# Patient Record
Sex: MALE | Race: WHITE
[De-identification: names, ages, dates, MRNs, and addresses within clinical notes are randomized per-mention and may not be internally consistent; named-entity substitution may affect disease eponyms.]

---

## 2019-09-10 ENCOUNTER — HOSPITAL ENCOUNTER (INPATIENT)
Dept: HOSPITAL 35 - ER | Age: 79
LOS: 3 days | Discharge: HOME | DRG: 552 | End: 2019-09-13
Attending: HOSPITALIST | Admitting: HOSPITALIST
Payer: COMMERCIAL

## 2019-09-10 VITALS — DIASTOLIC BLOOD PRESSURE: 84 MMHG | SYSTOLIC BLOOD PRESSURE: 137 MMHG

## 2019-09-10 VITALS — SYSTOLIC BLOOD PRESSURE: 133 MMHG | DIASTOLIC BLOOD PRESSURE: 82 MMHG

## 2019-09-10 VITALS — WEIGHT: 271.2 LBS | HEIGHT: 75 IN | BODY MASS INDEX: 33.72 KG/M2

## 2019-09-10 VITALS — DIASTOLIC BLOOD PRESSURE: 99 MMHG | SYSTOLIC BLOOD PRESSURE: 176 MMHG

## 2019-09-10 DIAGNOSIS — Z79.82: ICD-10-CM

## 2019-09-10 DIAGNOSIS — K21.9: ICD-10-CM

## 2019-09-10 DIAGNOSIS — G89.29: ICD-10-CM

## 2019-09-10 DIAGNOSIS — Z79.899: ICD-10-CM

## 2019-09-10 DIAGNOSIS — M54.31: ICD-10-CM

## 2019-09-10 DIAGNOSIS — D50.9: ICD-10-CM

## 2019-09-10 DIAGNOSIS — E87.1: ICD-10-CM

## 2019-09-10 DIAGNOSIS — N40.0: ICD-10-CM

## 2019-09-10 DIAGNOSIS — I10: ICD-10-CM

## 2019-09-10 DIAGNOSIS — M48.061: Primary | ICD-10-CM

## 2019-09-10 DIAGNOSIS — F32.9: ICD-10-CM

## 2019-09-10 DIAGNOSIS — M54.89: ICD-10-CM

## 2019-09-10 DIAGNOSIS — G47.33: ICD-10-CM

## 2019-09-10 DIAGNOSIS — Z87.891: ICD-10-CM

## 2019-09-10 DIAGNOSIS — M47.896: ICD-10-CM

## 2019-09-10 DIAGNOSIS — E11.42: ICD-10-CM

## 2019-09-10 DIAGNOSIS — E78.5: ICD-10-CM

## 2019-09-10 DIAGNOSIS — E83.42: ICD-10-CM

## 2019-09-10 LAB
ANION GAP SERPL CALC-SCNC: 8 MMOL/L (ref 7–16)
ANISOCYTOSIS BLD QL SMEAR: (no result)
BILIRUB UR-MCNC: NEGATIVE MG/DL
BUN SERPL-MCNC: 25 MG/DL (ref 7–18)
CALCIUM SERPL-MCNC: 8.9 MG/DL (ref 8.5–10.1)
CHLORIDE SERPL-SCNC: 94 MMOL/L (ref 98–107)
CO2 SERPL-SCNC: 26 MMOL/L (ref 21–32)
COLOR UR: YELLOW
CREAT SERPL-MCNC: 1.3 MG/DL (ref 0.7–1.3)
ERYTHROCYTE [DISTWIDTH] IN BLOOD BY AUTOMATED COUNT: 26.3 % (ref 10.5–14.5)
GLUCOSE SERPL-MCNC: 156 MG/DL (ref 74–106)
GRANULOCYTES NFR BLD MANUAL: 89 % (ref 36–66)
HCT VFR BLD CALC: 37.4 % (ref 42–52)
HGB BLD-MCNC: 12.3 GM/DL (ref 14–18)
KETONES UR STRIP-MCNC: NEGATIVE MG/DL
LYMPHOCYTES NFR BLD AUTO: 7 % (ref 24–44)
MCH RBC QN AUTO: 26.5 PG (ref 26–34)
MCHC RBC AUTO-ENTMCNC: 32.8 G/DL (ref 28–37)
MCV RBC: 80.8 FL (ref 80–100)
MONOCYTES NFR BLD: 2 % (ref 1–8)
NEUTROPHILS # BLD: 6.7 THOU/UL (ref 1.4–8.2)
NEUTS BAND NFR BLD: 2 % (ref 0–8)
PLATELET # BLD EST: NORMAL 10*3/UL
PLATELET # BLD: 193 THOU/UL (ref 150–400)
POTASSIUM SERPL-SCNC: 5.2 MMOL/L (ref 3.5–5.1)
RBC # BLD AUTO: 4.63 MIL/UL (ref 4.5–6)
RBC # UR STRIP: NEGATIVE /UL
SODIUM SERPL-SCNC: 128 MMOL/L (ref 136–145)
SP GR UR STRIP: 1.01 (ref 1–1.03)
URINE CLARITY: CLEAR
URINE GLUCOSE-RANDOM*: NEGATIVE
URINE LEUKOCYTES-REFLEX: (no result)
URINE NITRITE-REFLEX: NEGATIVE
URINE PROTEIN (DIPSTICK): NEGATIVE
UROBILINOGEN UR STRIP-ACNC: 0.2 E.U./DL (ref 0.2–1)
WBC # BLD AUTO: 7.4 THOU/UL (ref 4–11)

## 2019-09-10 PROCEDURE — 10040 EXTRACTION: CPT

## 2019-09-11 VITALS — SYSTOLIC BLOOD PRESSURE: 179 MMHG | DIASTOLIC BLOOD PRESSURE: 95 MMHG

## 2019-09-11 VITALS — DIASTOLIC BLOOD PRESSURE: 77 MMHG | SYSTOLIC BLOOD PRESSURE: 135 MMHG

## 2019-09-11 VITALS — SYSTOLIC BLOOD PRESSURE: 171 MMHG | DIASTOLIC BLOOD PRESSURE: 91 MMHG

## 2019-09-11 VITALS — DIASTOLIC BLOOD PRESSURE: 89 MMHG | SYSTOLIC BLOOD PRESSURE: 173 MMHG

## 2019-09-11 VITALS — DIASTOLIC BLOOD PRESSURE: 91 MMHG | SYSTOLIC BLOOD PRESSURE: 155 MMHG

## 2019-09-11 LAB
ANION GAP SERPL CALC-SCNC: 8 MMOL/L (ref 7–16)
BUN SERPL-MCNC: 31 MG/DL (ref 7–18)
CALCIUM SERPL-MCNC: 9.4 MG/DL (ref 8.5–10.1)
CHLORIDE SERPL-SCNC: 98 MMOL/L (ref 98–107)
CO2 SERPL-SCNC: 27 MMOL/L (ref 21–32)
CREAT SERPL-MCNC: 1.2 MG/DL (ref 0.7–1.3)
GLUCOSE SERPL-MCNC: 180 MG/DL (ref 74–106)
POTASSIUM SERPL-SCNC: 5.1 MMOL/L (ref 3.5–5.1)
SODIUM SERPL-SCNC: 133 MMOL/L (ref 136–145)

## 2019-09-11 NOTE — NUR
ASSUMED CARE FROM ED , PT AMBULATED FROM [facility] TO BED ASSESSMENT AND DATA
BASE COMPLETED, PAIN MEDICATION GIVEN AS PRESCRIBED , BP ELEVATED , NP NOTIFED
, NO ORDERS RECIEVED, WILL CONTINUE TO MONITOR.DISCUSSED PLAN OF CARE AND
AGREEABLE AND VERBALIZED UNDERSTANDING. PT RESTED WELL THROUGHOUT HOURLY
ROUNDS, WILL REPORT CHANGES OR ABNORMAL FINDINGS.

## 2019-09-11 NOTE — NUR
PT ADMITTED RELATED TO BACK PAIN CHRONIC. CM REVIEWED CHART AND SPOKE WITH
CARE TEAM. CM MET WITH PT AT BEDSIDE THIS DAY. PT IS A&O X4. CM ROLE
INTRODCUED. PT INDICATED THAT HE LIVES IN A HOUSE WITH HIS SPOUSE WITH 2 STEPS
TO ENTER AND NO STEPS INSIDE. PT INDICATED HE HAD BEEN USING A FWW TO ASSSIT
WITH MOBILITY PTA DUE TO PAIN. PT INDICATED NO HH OR SNF HX. PT INDICATED HE
HOPES TO RETURN HOME ONCE MEDICALLY STABLE. HE INDICATED THAT IF CARE TEAM
RECOMMENDS SHORT TERM POST ACUTE CARE STAY THAT THE WOULD BE RECEPTIVE. CM TO
FOLLOW AS INDICATED WITH DC PLANNING.

## 2019-09-11 NOTE — NUR
ASSUMED CARE OF PATIENT AT 0715, PATIENT ALERT AND ORIENTED X 4. PATIENT VSS.
PATIENT C/O PAIN WITH LEFT FOOT, RECEIVED HYDROCODONE X 2 THIS SHIFT. PATIENT
HAS LEFT FOREARM IV IN PLACE, RECEIVING IV ANTIBIOTICS. PATIENT NPO AFTER
BREAKFAST, DUE TO SURGERY AT 1730/DR GOVEA. DRESSING CHNAGE TO LEFT FOOT
TODAY, TOLERATED WELL. CONSULT FOR ENDOCRINE/DR MALONE CALLED TODAY. PATIENT
LEFT FLOOR AT 1635 FOR SURGERY. WILL CONTINUE TO MONITOR.

## 2019-09-11 NOTE — NUR
ASSSUMED CARE OF PATIENT AT 0715, PATIENT ALERT AND ORIENTED X4. PATIENT UP
WITH ASSIST X 1 WITH GAIT BELT AND WALKER. PATIENT CONTINUES TO C/O PAIN WITH
LOW BACK RADIATING DOWN LEGS. PATIENT RECEIEVED HYDROCODONE 2 TABLETS X 2 THIS
SHIFT. AM MEDS GIVEN AND PATIENT VOMITED ALL MEDS, NOTIFIED DR SOLORIO, PATIENT
GIVEN ZOFRAN IV, AND ALL AM MEDS REGIVEN, NO N/V REPORTED AFTER THIS AM
EPISODE. PATIENT HAS LEFT FOREARM IV WITH NS AT 80CC/HR. IV FLUIDS
DISCONTINUED THIS SHIFT. B/P ELEVATED THIS SHIFT 171/91 /95, THIS RN
NOTIFIED DR SOLORIO, RECEIVED ORDER FOR HYDRALAZINE 10 MG EVERY 4 HR/PRN B/P
GREATER THAN 170, THIS RN GAVE HYDRALAZINE 10 MG AT END OF THE SHIFT. BLOOD
SUGAR MONITORING AS ORDERED, S/S INSULIN GIVEN AS NEEDED. PT WORKED WITH
PATIENT THIS AFTERNOON. WILL CONTINUE TO MONITOR.

## 2019-09-12 VITALS — SYSTOLIC BLOOD PRESSURE: 148 MMHG | DIASTOLIC BLOOD PRESSURE: 78 MMHG

## 2019-09-12 VITALS — DIASTOLIC BLOOD PRESSURE: 83 MMHG | SYSTOLIC BLOOD PRESSURE: 147 MMHG

## 2019-09-12 VITALS — SYSTOLIC BLOOD PRESSURE: 138 MMHG | DIASTOLIC BLOOD PRESSURE: 85 MMHG

## 2019-09-12 LAB
ANION GAP SERPL CALC-SCNC: 9 MMOL/L (ref 7–16)
BUN SERPL-MCNC: 32 MG/DL (ref 7–18)
CALCIUM SERPL-MCNC: 9.3 MG/DL (ref 8.5–10.1)
CHLORIDE SERPL-SCNC: 97 MMOL/L (ref 98–107)
CO2 SERPL-SCNC: 27 MMOL/L (ref 21–32)
CREAT SERPL-MCNC: 1 MG/DL (ref 0.7–1.3)
ERYTHROCYTE [DISTWIDTH] IN BLOOD BY AUTOMATED COUNT: 26.6 % (ref 10.5–14.5)
GLUCOSE SERPL-MCNC: 111 MG/DL (ref 74–106)
HCT VFR BLD CALC: 38.5 % (ref 42–52)
HGB BLD-MCNC: 12.4 GM/DL (ref 14–18)
MCH RBC QN AUTO: 26.2 PG (ref 26–34)
MCHC RBC AUTO-ENTMCNC: 32.2 G/DL (ref 28–37)
MCV RBC: 81.3 FL (ref 80–100)
PLATELET # BLD: 218 THOU/UL (ref 150–400)
POTASSIUM SERPL-SCNC: 4.8 MMOL/L (ref 3.5–5.1)
RBC # BLD AUTO: 4.73 MIL/UL (ref 4.5–6)
SODIUM SERPL-SCNC: 133 MMOL/L (ref 136–145)
WBC # BLD AUTO: 11.3 THOU/UL (ref 4–11)

## 2019-09-12 NOTE — NUR
ASSUMED CARE OF PATIENT AT 0715, PATIENT ALERT AND ORIENTED X 4. PATIENT C/O
PAIN WITH LOW BACK RADIATING DOWN RIGHT LEG, RECEIVED HYDROCODONE 2 TABLETS X
2 THIS SHIFT. PATIENT WORKED WITH PT/OT TODAY. PATIENT HAS LEFT FOREARM IV IN
PLACE, FLUSHED WITH NS AND REMAINS PATENT. BLOOD SUGAR MONITORING AS ORDERED,
S/S INSULIN GIVEN PER BLOOD SUGAR READING. CONSULT ORDER FOR DR TRAMMELL TODAY.
PATIENT VOIDS PER URINAL. WILL CONTINUE TO MONITOR.

## 2019-09-13 VITALS — DIASTOLIC BLOOD PRESSURE: 98 MMHG | SYSTOLIC BLOOD PRESSURE: 162 MMHG

## 2019-09-13 VITALS — DIASTOLIC BLOOD PRESSURE: 78 MMHG | SYSTOLIC BLOOD PRESSURE: 148 MMHG

## 2019-09-13 NOTE — NUR
progress
 
pt a/o x4 rates back pain a 7 to 9 took 5mg hydrocodne x2 with relief pt slept
all night voiding per urinal, iv antibiotics and fluids administered as
ordered hopes to discharge in the next few days.

## 2019-09-13 NOTE — NUR
PAIN WELL CONTROLLED WITH HYDROCODONE. SAT UP IN CHAIR FOR MEALS. TOLERATING
DIET WELL. MRI COMPLETED. DISCHARGE INSTRUCTIONS GIVEN. DISCHARGED IN STABLE
CONDITION.